# Patient Record
Sex: FEMALE | Race: WHITE | Employment: UNEMPLOYED | URBAN - METROPOLITAN AREA
[De-identification: names, ages, dates, MRNs, and addresses within clinical notes are randomized per-mention and may not be internally consistent; named-entity substitution may affect disease eponyms.]

---

## 2021-09-21 ENCOUNTER — HOSPITAL ENCOUNTER (OUTPATIENT)
Dept: PHYSICAL THERAPY | Age: 71
Setting detail: THERAPIES SERIES
Discharge: HOME | End: 2021-09-21
Payer: MEDICARE

## 2021-09-21 PROCEDURE — 97161 PT EVAL LOW COMPLEX 20 MIN: CPT

## 2021-09-21 PROCEDURE — 97110 THERAPEUTIC EXERCISES: CPT

## 2021-09-21 ASSESSMENT — PAIN DESCRIPTION - DIRECTION: RADIATING_TOWARDS: DENIES

## 2021-09-21 ASSESSMENT — PAIN DESCRIPTION - FREQUENCY: FREQUENCY: INTERMITTENT

## 2021-09-21 ASSESSMENT — PAIN DESCRIPTION - LOCATION: LOCATION: HIP

## 2021-09-21 ASSESSMENT — PAIN DESCRIPTION - ORIENTATION: ORIENTATION: LEFT

## 2021-09-21 ASSESSMENT — PAIN SCALES - GENERAL: PAINLEVEL_OUTOF10: 4

## 2021-09-21 ASSESSMENT — PAIN DESCRIPTION - DESCRIPTORS: DESCRIPTORS: THROBBING

## 2021-09-21 NOTE — PROGRESS NOTES
[address]     Ph: [phone]  Fax: [phone]    [] Certification  [] Recertification [x]  Plan of Care  [] Progress Note [] Discharge      To:  Pedro Cruz MD      From:  Mckenzie Pratt, PT  Patient: Pollo Fearing     : 1950  Diagnosis: other intervertebral disc degeneration, lumbar region     Date: 2021  Treatment Diagnosis: decreased standing posture, decreased lumbar spine ROM, decreased LE strength, decreased activity tolerance for regular exercise, walking and increased pain with ascending steps       Progress Report Period from:  2021  to 2021    Total # of Visits to Date: 1   No Show: 0    Canceled Appointment: 0     OBJECTIVE:   Short Term Goals - Time Frame for Short term goals: 2 wks    Goals Current/Discharge status  Met   Short term goal 1: Pt will demonstrate improved trunk extensor and B LE strength >/= 4+/5 for carryover to decreased pain with stair negotiation and prolonged amb. Strength RLE  Strength RLE: Exception  R Hip Flexion: 4+/5  R Hip Extension: 3+/5  R Hip ABduction: 3+/5  R Knee Flexion: 5/5  R Knee Extension: 5/5  R Ankle Dorsiflexion: 5/5  Strength LLE  Strength LLE: Exception  L Hip Flexion: 4+/5  L Hip Extension: 3/5  L Hip ABduction: 3/5  L Knee Flexion: 5/5  L Knee Extension: 5/5  L Ankle Dorsiflexion: 5/5        Strength Other  Other: isometric abdominal strength 5/5; isometric trunk ext strength 3/5   [] yes  [x] no     Long Term Goals - Time Frame for Long term goals : 6 wks  Goals Current/ Discharge status Met   Long term goal 1: Pt will demonstrate indep and compliance with % of the time for self management of L hip pain. HEP initiated [] yes  [x] no   Long term goal 2: Pt will demonstrate improved score on LEFS 70/80 indicating improved pt quality of life.  LEFS 39/80   [] yes  [] no   Long term goal 3: The pt will report decreased pain </=1/10 at worst in order to decresed pain with stair negoitation and prolonged amb. Pain Location: Hip    Pain Level: 4 (0-6/10)    Pain Descriptors: Throbbing   [] yes  [x] no     Body structures, Functions, Activity limitations: Decreased functional mobility , Increased pain, Decreased posture, Decreased ROM, Decreased strength, Decreased endurance  Assessment: Pt is 79 y.o. female with worsening L lateral hip pain. MD attributes to lumbar spine deficits; howevery, pt without c/o back pain. Lumbar x-ray shows diffuse arthritis and multilevel stenosis on 8/31/2021. Pt exhibits impairments including decreased standing posture, decreased lumbar spine ROM, decreased LE strength, decreased activity tolerance for regular exercise, walking and increased pain with ascending steps. Pt requires continued PT to address these impairments, progress strength and ROM, and provide pt with HEP for self management of pain. Prognosis: Good  Discharge Recommendations: Continue to assess pending progress      PT Education: Goals;PT Role;Plan of Care;Home Exercise Program    PLAN: [x] Evaluate only  Frequency/Duration:  Plan  Times per week: 2  Plan weeks: 6  Current Treatment Recommendations: Strengthening, Neuromuscular Re-education, Home Exercise Program, ROM, Manual Therapy - Soft Tissue Mobilization, Safety Education & Training, Balance Training, Endurance Training, Patient/Caregiver Education & Training, Functional Mobility Training, Equipment Evaluation, Education, & procurement, Modalities, Pain Management, Positioning                    Patient Status:[x] Continue/ Initiate plan of Care (tx to be initiated after continued visits approved by insurance)    [] Discharge PT. Recommend pt continue with HEP.      [] Additional visits requested, Please re-certify for additional visits:          Signature: Electronically signed by Paresh Sloan PT on 9/21/21 at 10:15 AM EDT      If you have any questions or concerns, please don't hesitate to call. Thank you for your referral.    I have reviewed this plan of care and certify a need for medically necessary rehabilitation services.     Physician Signature:__________________________________________________________  Date:  Please sign and return

## 2021-09-21 NOTE — PROGRESS NOTES
[address]  PHYSICAL THERAPY EVALUATION    Date: 2021  Patient Name: Maria L Paz       MRN: [id number]   Account: [de-identified]   : 1950  (69 y.o.)   Gender: female   Referring Practitioner: Lisa Vences MD                 Diagnosis: other intervertebral disc degeneration, lumbar region  Treatment Diagnosis: decreased standing posture, decreased lumbar spine ROM, decreased LE strength, decreased activity tolerance for regular exercise, walking and increased pain with ascending steps  Additional Pertinent Hx: shingles, OA        Past Medical History:  has no past medical history on file. Past Surgical History:   has no past surgical history on file. Vital Signs  Patient Currently in Pain: Yes   Pain Screening  Patient Currently in Pain: Yes  Pain Assessment  Pain Assessment: 0-10  Pain Level: 4 (0-6/10)  Pain Location: Hip  Pain Orientation: Left  Pain Radiating Towards: denies  Pain Descriptors: Throbbing  Pain Frequency: Intermittent     Lives With: Spouse  Type of Home: House  Home Layout: One level  Active : Yes     Subjective:  Subjective: Pt to PT due to continued and worsening pain in L lateral hip. Pt stated went to chiropractor 2 weeks ago- provided pt with edu on tennis ball massage, provided pt with stretches. While at appt with general practitioner, x-ray completed on lumbar spine: vertebral bodies normally aligned and normal height, no scoliosis, moderate disc space reduction is seen throughout the lumbar spine, no spondylolysis or spondylolisthesis, no fx, mild chronic anterior wedging within thoracolumbar region, soft tissue unremarkable. Pt states walks miles daily, bikes 3-4 xs/wk- pt reports increased pain with walking and ascending steps. Pt uses biofreeze at night, uses ice and heat for pain relief. Pt unable to lay on L side due to increased pain at night. No pain while sitting and resting.     Objective:   Sensation  Overall structures, Functions, Activity limitations: Decreased functional mobility , Increased pain, Decreased posture, Decreased ROM, Decreased strength, Decreased endurance  Assessment: Pt is 79 y.o. female with worsening L lateral hip pain. MD attributes to lumbar spine deficits; howevery, pt without c/o back pain. Lumbar x-ray shows diffuse arthritis and multilevel stenosis on 8/31/2021. Pt exhibits impairments including decreased standing posture, decreased lumbar spine ROM, decreased LE strength, decreased activity tolerance for regular exercise, walking and increased pain with ascending steps. Pt requires continued PT to address these impairments, progress strength and ROM, and provide pt with HEP for self management of pain. Prognosis: Good  Discharge Recommendations: Continue to assess pending progress     Decision Making: Low Complexity  History: low- shingles, OA  Exam: med- LEFS 39/80  Clinical Presentation: evolving- med    Plan  Frequency/Duration:  Plan  Times per week: 2  Plan weeks: 6  Current Treatment Recommendations: Strengthening, Neuromuscular Re-education, Home Exercise Program, ROM, Manual Therapy - Soft Tissue Mobilization, Safety Education & Training, Balance Training, Endurance Training, Patient/Caregiver Education & Training, Functional Mobility Training, Equipment Evaluation, Education, & procurement, Modalities, Pain Management, Positioning    Patient Education  New Education Provided: PT Education: Goals;PT Role;Plan of Care;Home Exercise Program    POST-PAIN     Pain Rating (0-10 pain scale):  4 /10  Location and pain description same as pre-treatment unless indicated. Action: [] NA  [] Call Physician  [] Perform HEP  [] Meds as prescribed    Evaluation and patient rights have been reviewed and patient agrees with plan of care.   Yes  [x]  No  []   Explain:       Abraham Fall Risk Assessment  Risk Factor Scale  Score   History of Falls [] Yes  [x] No 25  0 0   Secondary Diagnosis [] Yes  [x] No 15  0 0   Ambulatory Aid [] Furniture  [] Crutches/cane/walker  [x] None/bedrest/wheelchair/nurse 30  15  0 0   IV/Heparin Lock [] Yes  [x] No 20  0 0   Gait/Transferring [] Impaired  [] Weak  [x] Normal/bedrest/immobile 20  10  0 0   Mental Status [] Forgets limitations  [x] Oriented to own ability 15  0 0      Total:0     Based on the Assessment score: check the appropriate box. [x]  No intervention needed   Low =   Score of 0-24  []  Use standard prevention interventions Moderate =  Score of 24-44   [] Discuss fall prevention strategies   [] Indicate moderate falls risk on eval  []  Use high risk prevention interventions High = Score of 45 and higher   [] Discuss fall prevention strategies   [] Provide supervision during treatment time    Goals  Short term goals  Time Frame for Short term goals: 2 wks  Short term goal 1: Pt will demonstrate improved trunk extensor and B LE strength >/= 4+/5 for carryover to decreased pain with stair negotiation and prolonged amb. Long term goals  Time Frame for Long term goals : 6 wks  Long term goal 1: Pt will demonstrate indep and compliance with % of the time for self management of L hip pain. Long term goal 2: Pt will demonstrate improved score on LEFS 70/80 indicating improved pt quality of life. Long term goal 3: The pt will report decreased pain </=1/10 at worst in order to decresed pain with stair negoitation and prolonged amb.     PT Individual Minutes  Time In: 0840  Time Out: 7762  Minutes: 45  Procedure Minutes: jesus 45 min     Electronically signed by Donna Forde PT on 9/21/21 at 10:10 AM EDT

## 2021-09-29 ENCOUNTER — HOSPITAL ENCOUNTER (OUTPATIENT)
Dept: PHYSICAL THERAPY | Age: 71
Setting detail: THERAPIES SERIES
Discharge: HOME | End: 2021-09-29
Payer: MEDICARE

## 2021-09-29 PROCEDURE — 97110 THERAPEUTIC EXERCISES: CPT

## 2021-09-29 ASSESSMENT — PAIN DESCRIPTION - ORIENTATION: ORIENTATION: LEFT

## 2021-09-29 ASSESSMENT — PAIN SCALES - GENERAL: PAINLEVEL_OUTOF10: 3

## 2021-09-29 ASSESSMENT — PAIN DESCRIPTION - LOCATION: LOCATION: HIP

## 2021-09-29 NOTE — PROGRESS NOTES
[address] [address]  Outpatient Physical Therapy    Treatment Note        Date: 2021  Patient: Raymond Escobar  : 1950  ACCT #: [de-identified]  Referring Practitioner: Milo De Souza MD  Diagnosis: other intervertebral disc degeneration, lumbar region  Treatment Diagnosis: decreased standing posture, decreased lumbar spine ROM, decreased LE strength, decreased activity tolerance for regular exercise, walking and increased pain with ascending steps    Visit Information:  PT Visit Information  Onset Date:  (2021)  PT Insurance Information: BCBS  Total # of Visits Approved:  (eval only- 8 visits given -)  Total # of Visits to Date: 2  No Show: 0  Canceled Appointment: 0  Progress Note Counter:     Subjective: 3/10 L lateral hip     HEP Compliance:  [] Good [x] Fair [] Poor [] Reports not doing due to:    Vital Signs  Patient Currently in Pain: Yes   Pain Screening  Patient Currently in Pain: Yes  Pain Assessment  Pain Level: 3  Pain Location: Hip  Pain Orientation: Left    OBJECTIVE:   Exercises  Exercise 1: bike fwd/back 2.5  Exercise 2: figure 4 seated 30 sec X 3 b/l  Exercise 3: sciatic nerve glide R/L*  Exercise 4: TA iso 10 sec  X10  Exercise 5: bridge X 10  Exercise 6: LTR X 10- cues for TA iso  Exercise 7: SKTC 20 sec X 3 B  Exercise 8: s/l clam, reverse clam, hip abd X 10 L LE ea  Exercise 9: prone hip ext X 10 b/l  Exercise 10: prone press ups*  Exercise 11: DLS progression supine/quadriped*  Exercise 12: 4 way hip*  Exercise 13: side plank bent knees*  Exercise 14: step ups fwd/lat*  Exercise 15: quadriped*  Exercise 16: ITB stretch 20 sec X 3  Exercise 20: HEP: bridge, LTR, SKTC, clam, reverse clam, hip abd, ITB stretch, prone hip ext    Strength: [x] NT  [] MMT completed:    ROM: [x] NT  [] ROM measurements:    Modalities:  Modalities  Moist heat: L hip X 10 min  Cryotherapy (Minutes\Location):  *  Ultrasound: L lateral hip 1 MHz, 100% duty cycle, 0.5 W/cm^2*  E-stim (parameters): IFC L hip*   *Indicates exercise, modality, or manual techniques to be initiated when appropriate    Assessment: Body structures, Functions, Activity limitations: Decreased functional mobility , Increased pain, Decreased posture, Decreased ROM, Decreased strength, Decreased endurance  Assessment: Initiated progression of core and lateral hip strengthening and stretching B hips for carryover to decreased L lateral hip pain- no c/o pain during ex. MH used at end of session to decreased fatigue. Treatment Diagnosis: decreased standing posture, decreased lumbar spine ROM, decreased LE strength, decreased activity tolerance for regular exercise, walking and increased pain with ascending steps  Prognosis: Good    Goals:  Short term goals  Time Frame for Short term goals: 2 wks  Short term goal 1: Pt will demonstrate improved trunk extensor and B LE strength >/= 4+/5 for carryover to decreased pain with stair negotiation and prolonged amb. Long term goals  Time Frame for Long term goals : 6 wks  Long term goal 1: Pt will demonstrate indep and compliance with % of the time for self management of L hip pain. Long term goal 2: Pt will demonstrate improved score on LEFS 70/80 indicating improved pt quality of life. Long term goal 3: The pt will report decreased pain </=1/10 at worst in order to decresed pain with stair negoitation and prolonged amb. Progress toward goals: ongoing, progressing strength and ROM    POST-PAIN       Pain Rating (0-10 pain scale):   0/10   Location and pain description same as pre-treatment unless indicated.    Action: [x] NA   [] Perform HEP  [] Meds as prescribed  [] Modalities as prescribed   [] Call Physician     Frequency/Duration:  Plan  Times per week: 2  Plan weeks: 6  Current Treatment Recommendations: Strengthening, Neuromuscular Re-education, Home Exercise Program, ROM, Manual Therapy - Soft Tissue Mobilization, Safety Education & Training, Balance Training, Endurance Training, Patient/Caregiver Education & Training, Functional Mobility Training, Equipment Evaluation, Education, & procurement, Modalities, Pain Management, Positioning     Pt to continue current HEP. See objective section for any therapeutic exercise changes, additions or modifications this date.     PT Individual Minutes  Time In: 9749  Time Out: 1130  Minutes: 50  Timed Code Treatment Minutes: 40 Minutes  Procedure Minutes: MH X 10 min     Timed Activity Minutes Units   Ther Ex 40 3     Signature:  Electronically signed by Elijah Hughes PT on 9/29/21 at 11:43 AM EDT

## 2021-10-01 ENCOUNTER — HOSPITAL ENCOUNTER (OUTPATIENT)
Dept: PHYSICAL THERAPY | Age: 71
Setting detail: THERAPIES SERIES
Discharge: HOME | End: 2021-10-01
Payer: MEDICARE

## 2021-10-05 ENCOUNTER — HOSPITAL ENCOUNTER (OUTPATIENT)
Dept: PHYSICAL THERAPY | Age: 71
Setting detail: THERAPIES SERIES
Discharge: HOME | End: 2021-10-05
Payer: MEDICARE

## 2021-10-05 PROCEDURE — 97110 THERAPEUTIC EXERCISES: CPT

## 2021-10-05 ASSESSMENT — PAIN DESCRIPTION - ORIENTATION: ORIENTATION: LEFT

## 2021-10-05 ASSESSMENT — PAIN DESCRIPTION - LOCATION: LOCATION: HIP

## 2021-10-05 ASSESSMENT — PAIN SCALES - GENERAL: PAINLEVEL_OUTOF10: 3

## 2021-10-05 NOTE — PROGRESS NOTES
[id number] [address]  Outpatient Physical Therapy    Treatment Note        Date: 10/5/2021  Patient: Elmo Perera  : 1950  ACCT #: [de-identified]  Referring Practitioner: Elli Alan MD  Diagnosis: other intervertebral disc degeneration, lumbar region  Treatment Diagnosis: decreased standing posture, decreased lumbar spine ROM, decreased LE strength, decreased activity tolerance for regular exercise, walking and increased pain with ascending steps    Visit Information:  PT Visit Information  Onset Date:  (2021)  PT Insurance Information: BCBS  Total # of Visits Approved:  (eval only- 8 visits given -)  Total # of Visits to Date: 3  No Show: 0  Canceled Appointment: 1  Progress Note Counter:     Subjective: 3/10 L lateral hip; \"my mom passed away and I haven't been able to do my exercises like I usually do\"     HEP Compliance:  [x] Good [] Fair [] Poor [] Reports not doing due to:    Vital Signs  Patient Currently in Pain: Yes   Pain Screening  Patient Currently in Pain: Yes  Pain Assessment  Pain Level: 3  Pain Location: Hip  Pain Orientation: Left    OBJECTIVE:   Exercises  Exercise 1: bike fwd/back 2.5  Exercise 2: figure 4 seated 30 sec X 3 b/l  Exercise 3: sciatic nerve glide R/L supine and seated  Exercise 5: bridge single leg X 10 b/l  Exercise 8: s/l clam, reverse clam, hip abd X 10 L LE ea  Exercise 10: prone press ups*  Exercise 11: DLS progression supine*  Exercise 12: 4 way hip*  Exercise 13: side plank bent knees*  Exercise 14: step ups fwd/lat*  Exercise 15: quadriped hip ext, fire hydrant X 10  b/l  Exercise 16: ITB stretch 20 sec X 3  Exercise 20: HEP: seated sciatic nerve glide, SL bridge, quadruped leg ext and fire hydrant, salima pose, side plank on knees    Strength: [x] NT  [] MMT completed:    ROM: [x] NT  [] ROM measurements:    Modalities:  Modalities  Moist heat: L hip X 10 min  Cryotherapy (Minutes\Location):  *  Ultrasound: L lateral hip 1 MHz, 100% duty cycle, 0.5 W/cm^2*  E-stim (parameters): IFC L hip*   *Indicates exercise, modality, or manual techniques to be initiated when appropriate    Assessment: Body structures, Functions, Activity limitations: Decreased functional mobility , Increased pain, Decreased posture, Decreased ROM, Decreased strength, Decreased endurance  Assessment: Continued progression of core and lateral hip strengthening and stretching B hips for carryover to decreased L lateral hip pain- no c/o pain during ex. Pt provided with continued progressions of HEP and pt consistenly completing HEP. Pt stated MH decreased pain at end of last session and is using heat at home. MH used at end of session to decreased fatigue. Treatment Diagnosis: decreased standing posture, decreased lumbar spine ROM, decreased LE strength, decreased activity tolerance for regular exercise, walking and increased pain with ascending steps  Prognosis: Good       Goals:  Short term goals  Time Frame for Short term goals: 2 wks  Short term goal 1: Pt will demonstrate improved trunk extensor and B LE strength >/= 4+/5 for carryover to decreased pain with stair negotiation and prolonged amb. Long term goals  Time Frame for Long term goals : 6 wks  Long term goal 1: Pt will demonstrate indep and compliance with % of the time for self management of L hip pain. Long term goal 2: Pt will demonstrate improved score on LEFS 70/80 indicating improved pt quality of life. Long term goal 3: The pt will report decreased pain </=1/10 at worst in order to decresed pain with stair negoitation and prolonged amb. Progress toward goals: ongoing, progressing strength and ROM    POST-PAIN       Pain Rating (0-10 pain scale):   1/10   Location and pain description same as pre-treatment unless indicated.    Action: [x] NA   [] Perform HEP  [] Meds as prescribed  [] Modalities as prescribed   [] Call Physician     Frequency/Duration:  Plan  Times per week: 2  Plan weeks: 6  Current Treatment Recommendations: Strengthening, Neuromuscular Re-education, Home Exercise Program, ROM, Manual Therapy - Soft Tissue Mobilization, Safety Education & Training, Balance Training, Endurance Training, Patient/Caregiver Education & Training, Functional Mobility Training, Equipment Evaluation, Education, & procurement, Modalities, Pain Management, Positioning     Pt to continue current HEP. See objective section for any therapeutic exercise changes, additions or modifications this date.     PT Individual Minutes  Time In: 0302  Time Out: 1430  Minutes: 48  Timed Code Treatment Minutes: 38 Minutes  Procedure Minutes: MH X 10 min     Timed Activity Minutes Units   Ther Ex 38 3     Signature:  Electronically signed by Elvis Goldberg, PT on 10/5/21 at 3:31 PM EDT

## 2021-10-07 ENCOUNTER — HOSPITAL ENCOUNTER (OUTPATIENT)
Dept: PHYSICAL THERAPY | Age: 71
Setting detail: THERAPIES SERIES
Discharge: HOME | End: 2021-10-07
Payer: MEDICARE

## 2021-10-07 PROCEDURE — 97110 THERAPEUTIC EXERCISES: CPT

## 2021-10-07 ASSESSMENT — PAIN DESCRIPTION - LOCATION: LOCATION: HIP

## 2021-10-07 ASSESSMENT — PAIN SCALES - GENERAL: PAINLEVEL_OUTOF10: 4

## 2021-10-07 ASSESSMENT — PAIN DESCRIPTION - ORIENTATION: ORIENTATION: LEFT

## 2021-10-07 NOTE — PROGRESS NOTES
[address] [address]  Outpatient Physical Therapy    Treatment Note        Date: 10/7/2021  Patient: Gerardo Fabian  : 1950  ACCT #: [de-identified]  Referring Practitioner: Robert Rivas MD  Diagnosis: other intervertebral disc degeneration, lumbar region  Treatment Diagnosis: decreased standing posture, decreased lumbar spine ROM, decreased LE strength, decreased activity tolerance for regular exercise, walking and increased pain with ascending steps    Visit Information:  PT Visit Information  Onset Date:  (2021)  PT Insurance Information: BCBS  Total # of Visits Approved:  (eval only- 8 visits given -)  Total # of Visits to Date: 4  No Show: 0  Canceled Appointment: 1  Progress Note Counter: 3/8    Subjective: 4/10 L lateral hip     HEP Compliance:  [x] Good [] Fair [] Poor [] Reports not doing due to:    Vital Signs  Patient Currently in Pain: Yes   Pain Screening  Patient Currently in Pain: Yes  Pain Assessment  Pain Level: 4  Pain Location: Hip  Pain Orientation: Left    OBJECTIVE:   Exercises  Exercise 1: bike fwd/back 2.5  Exercise 2: figure 4 seated 30 sec X 3 b/l  Exercise 7: SKTC 30 sec X 3 B  Exercise 10: prone press ups with TA iso X 10  Exercise 11: DLS progression supine*  Exercise 12: 4 way hip*  Exercise 14: step ups fwd/lat*  Exercise 15: quadriped hip ext, fire hydrant, UE, and bird dog X 10  b/l  Exercise 16: ITB stretch 20 sec X 3  Exercise 17: DKTC on pball with TA iso X 15  Exercise 18: salima pose 30 sec X 3  Exercise 20: HEP: DKTC pball, prone press ups, quad alter arm and bird dog    Strength: [x] NT  [] MMT completed:    ROM: [x] NT  [] ROM measurements:    Modalities:  Modalities  Moist heat: *  Cryotherapy (Minutes\Location): L hip X 10 min  Ultrasound: L lateral hip 1 MHz, 100% duty cycle, 0.5 W/cm^2*  E-stim (parameters): IFC L hip*   *Indicates exercise, modality, or manual techniques to be initiated when appropriate    Assessment:    Body structures, Functions, Activity limitations: Decreased functional mobility , Increased pain, Decreased posture, Decreased ROM, Decreased strength, Decreased endurance  Assessment: Continued progression of core and lateral hip strengthening and stretching B hips for carryover to decreased L lateral hip pain- no c/o pain during ex. Pt provided with continued progressions of HEP and pt consistenly completing HEP. Trialed CP at end of session for decreased pain to compared to [address]. Treatment Diagnosis: decreased standing posture, decreased lumbar spine ROM, decreased LE strength, decreased activity tolerance for regular exercise, walking and increased pain with ascending steps  Prognosis: Good       Goals:  Short term goals  Time Frame for Short term goals: 2 wks  Short term goal 1: Pt will demonstrate improved trunk extensor and B LE strength >/= 4+/5 for carryover to decreased pain with stair negotiation and prolonged amb. Long term goals  Time Frame for Long term goals : 6 wks  Long term goal 1: Pt will demonstrate indep and compliance with % of the time for self management of L hip pain. Long term goal 2: Pt will demonstrate improved score on LEFS 70/80 indicating improved pt quality of life. Long term goal 3: The pt will report decreased pain </=1/10 at worst in order to decresed pain with stair negoitation and prolonged amb. Progress toward goals: ongoing, progressing strength and ROM    POST-PAIN       Pain Rating (0-10 pain scale):   2/10   Location and pain description same as pre-treatment unless indicated.    Action: [x] NA   [] Perform HEP  [] Meds as prescribed  [] Modalities as prescribed   [] Call Physician     Frequency/Duration:  Plan  Times per week: 2  Plan weeks: 6  Current Treatment Recommendations: Strengthening, Neuromuscular Re-education, Home Exercise Program, ROM, Manual Therapy - Soft Tissue Mobilization, Safety Education & Training, Balance Training, Endurance Training, Patient/Caregiver Education & Training, Functional Mobility Training, Equipment Evaluation, Education, & procurement, Modalities, Pain Management, Positioning     Pt to continue current HEP. See objective section for any therapeutic exercise changes, additions or modifications this date.     PT Individual Minutes  Time In: 2165  Time Out: 0930  Minutes: 49  Timed Code Treatment Minutes: 39 Minutes  Procedure Minutes: CP X 10 min     Timed Activity Minutes Units   Ther Ex 39 3     Signature:  Electronically signed by Bret Ramos PT on 10/7/21 at 9:27 AM EDT

## 2021-10-12 ENCOUNTER — HOSPITAL ENCOUNTER (OUTPATIENT)
Dept: PHYSICAL THERAPY | Age: 71
Setting detail: THERAPIES SERIES
Discharge: HOME | End: 2021-10-12
Payer: MEDICARE

## 2021-10-12 PROCEDURE — 97110 THERAPEUTIC EXERCISES: CPT

## 2021-10-12 ASSESSMENT — PAIN DESCRIPTION - ORIENTATION: ORIENTATION: LEFT

## 2021-10-12 ASSESSMENT — PAIN DESCRIPTION - PAIN TYPE: TYPE: ACUTE PAIN

## 2021-10-12 ASSESSMENT — PAIN DESCRIPTION - LOCATION: LOCATION: HIP

## 2021-10-12 NOTE — PROGRESS NOTES
[address] [address]  Outpatient Physical Therapy    Treatment Note        Date: 10/12/2021  Patient: Britney Duke  : 1950  ACCT #: [de-identified]  Referring Practitioner: Monae Cervantes MD  Diagnosis: other intervertebral disc degeneration, lumbar region  Treatment Diagnosis: decreased standing posture, decreased lumbar spine ROM, decreased LE strength, decreased activity tolerance for regular exercise, walking and increased pain with ascending steps    Visit Information:  PT Visit Information  Onset Date:  (2021)  PT Insurance Information: [employer]  Total # of Visits Approved:  (eval only- 8 visits given -)  Total # of Visits to Date: 5  No Show: 0  Canceled Appointment: 1  Progress Note Counter:     Subjective: 2/10 L lateral hip     HEP Compliance:  [x] Good [] Fair [] Poor [] Reports not doing due to:    Vital Signs  Patient Currently in Pain: Yes   Pain Screening  Patient Currently in Pain: Yes  Pain Assessment  Pain Type: Acute pain  Pain Location: Hip  Pain Orientation: Left    OBJECTIVE:   Exercises  Exercise 1: bike fwd/back 2.5  Exercise 2: figure 4 seated 30 sec X 3 b/l  Exercise 3: sciatic nerve glide R/L supine X 3 b/l  Exercise 11: DLS progression supine*  Exercise 12: 4 way hip YTB b/l X 10 with unilat UE support  Exercise 14: step ups fwd/lat*  Exercise 15: quadriped hip ext, fire hydrant, UE, and bird dog X 10  b/l  Exercise 16: ITB stretch standing 20 sec X 3  Exercise 19: foam roller L hip X 2 min  Exercise 20: HEP: standing IT band stretch; 4 way hip with TB    Strength: [x] NT  [] MMT completed:    ROM: [x] NT  [] ROM measurements:    Modalities:  Modalities  Moist heat: *  Cryotherapy (Minutes\Location): L hip X 10 min  Ultrasound: L lateral hip 1 MHz, 100% duty cycle, 0.5 W/cm^2*  E-stim (parameters): IFC L hip*   *Indicates exercise, modality, or manual techniques to be initiated when appropriate    Assessment:    Body structures, Functions, Activity limitations: Decreased functional mobility , Increased pain, Decreased posture, Decreased ROM, Decreased strength, Decreased endurance  Assessment: Continued progression of core and lateral hip strengthening and stretching B hips for carryover to decreased L lateral hip pain- no c/o pain during ex. Pt provided with continued progressions of HEP and pt consistenly completing HEP. Pt stated no difference b/t pain control with MH vs CP. CP provided at end of session for decreased pain. Treatment Diagnosis: decreased standing posture, decreased lumbar spine ROM, decreased LE strength, decreased activity tolerance for regular exercise, walking and increased pain with ascending steps  Prognosis: Good       Goals:  Short term goals  Time Frame for Short term goals: 2 wks  Short term goal 1: Pt will demonstrate improved trunk extensor and B LE strength >/= 4+/5 for carryover to decreased pain with stair negotiation and prolonged amb. Long term goals  Time Frame for Long term goals : 6 wks  Long term goal 1: Pt will demonstrate indep and compliance with % of the time for self management of L hip pain. Long term goal 2: Pt will demonstrate improved score on LEFS 70/80 indicating improved pt quality of life. Long term goal 3: The pt will report decreased pain </=1/10 at worst in order to decresed pain with stair negoitation and prolonged amb. Progress toward goals: ongoing, progressing strength and ROM    POST-PAIN       Pain Rating (0-10 pain scale):   1/10   Location and pain description same as pre-treatment unless indicated.    Action: [x] NA   [] Perform HEP  [] Meds as prescribed  [] Modalities as prescribed   [] Call Physician     Frequency/Duration:  Plan  Times per week: 2  Plan weeks: 6  Current Treatment Recommendations: Strengthening, Neuromuscular Re-education, Home Exercise Program, ROM, Manual Therapy - Soft Tissue Mobilization, Safety Education & Training, Balance Training, Endurance Training, Patient/Caregiver Education & Training, Functional Mobility Training, Equipment Evaluation, Education, & procurement, Modalities, Pain Management, Positioning     Pt to continue current HEP. See objective section for any therapeutic exercise changes, additions or modifications this date.     PT Individual Minutes  Time In: 0803  Time Out: 9879  Minutes: 48  Timed Code Treatment Minutes: 38 Minutes  Procedure Minutes: CP X 10 min     Timed Activity Minutes Units   Ther Ex 38 3       Signature:  Electronically signed by Jaren Calvo PT on 10/12/21 at 8:48 AM EDT

## 2021-10-14 ENCOUNTER — HOSPITAL ENCOUNTER (OUTPATIENT)
Dept: PHYSICAL THERAPY | Age: 71
Setting detail: THERAPIES SERIES
Discharge: HOME | End: 2021-10-14
Payer: MEDICARE

## 2021-10-14 PROCEDURE — 97110 THERAPEUTIC EXERCISES: CPT

## 2021-10-14 PROCEDURE — 97035 APP MDLTY 1+ULTRASOUND EA 15: CPT

## 2021-10-14 ASSESSMENT — PAIN DESCRIPTION - LOCATION: LOCATION: HIP

## 2021-10-14 ASSESSMENT — PAIN SCALES - GENERAL: PAINLEVEL_OUTOF10: 2

## 2021-10-14 NOTE — PROGRESS NOTES
Activity limitations: Decreased functional mobility , Increased pain, Decreased posture, Decreased ROM, Decreased strength, Decreased endurance  Assessment: Continued progression of core and lateral hip strengthening and stretching B hips for carryover to decreased L lateral hip pain- no c/o pain during ex. Due to continued c/o 2/10 pain L hip- initiated US for pain relief this- pt to assess for benefits. 1/10 at end of session. Treatment Diagnosis: decreased standing posture, decreased lumbar spine ROM, decreased LE strength, decreased activity tolerance for regular exercise, walking and increased pain with ascending steps  Prognosis: Good       Goals:  Short term goals  Time Frame for Short term goals: 2 wks  Short term goal 1: Pt will demonstrate improved trunk extensor and B LE strength >/= 4+/5 for carryover to decreased pain with stair negotiation and prolonged amb. Long term goals  Time Frame for Long term goals : 6 wks  Long term goal 1: Pt will demonstrate indep and compliance with % of the time for self management of L hip pain. Long term goal 2: Pt will demonstrate improved score on LEFS 70/80 indicating improved pt quality of life. Long term goal 3: The pt will report decreased pain </=1/10 at worst in order to decresed pain with stair negoitation and prolonged amb. Progress toward goals: ongoing progressing strength and ROM    POST-PAIN       Pain Rating (0-10 pain scale):   1/10   Location and pain description same as pre-treatment unless indicated.    Action: [x] NA   [] Perform HEP  [] Meds as prescribed  [] Modalities as prescribed   [] Call Physician     Frequency/Duration:  Plan  Times per week: 2  Plan weeks: 6  Current Treatment Recommendations: Strengthening, Neuromuscular Re-education, Home Exercise Program, ROM, Manual Therapy - Soft Tissue Mobilization, Safety Education & Training, Balance Training, Endurance Training, Patient/Caregiver Education & Training, Functional Mobility Training, Equipment Evaluation, Education, & procurement, Modalities, Pain Management, Positioning     Pt to continue current HEP. See objective section for any therapeutic exercise changes, additions or modifications this date.     PT Individual Minutes  Time In: 5337  Time Out: 9346  Minutes: 43  Timed Code Treatment Minutes: 43 Minutes    Timed Activity Minutes Units   Ther Ex 35 2   US 8 1       Signature:  Electronically signed by Mitzy Hector PT on 10/14/21 at 12:06 PM EDT

## 2021-11-09 ENCOUNTER — HOSPITAL ENCOUNTER (OUTPATIENT)
Dept: PHYSICAL THERAPY | Age: 71
Setting detail: THERAPIES SERIES
Discharge: HOME | End: 2021-11-09
Payer: MEDICARE

## 2021-11-09 NOTE — PROGRESS NOTES
[address]     Ph: [phone]  Fax: [phone]    [] Certification  [] Recertification []  Plan of Care  [] Progress Note [x] Discharge      To:  Rosa Maria Sandhu MD      From:  Ekta Washburn, PT  Patient: Donny Mckinnon     : 1950  Diagnosis: other intervertebral disc degeneration, lumbar region     Date: 2021  Treatment Diagnosis: decreased standing posture, decreased lumbar spine ROM, decreased LE strength, decreased activity tolerance for regular exercise, walking and increased pain with ascending steps       Progress Report Period from:  2021  to 2021    Total # of Visits to Date: 6   No Show: 0    Canceled Appointment: 1     OBJECTIVE:   Short Term Goals - Time Frame for Short term goals: 2 wks    Goals Current/Discharge status  Met   Short term goal 1: Pt will demonstrate improved trunk extensor and B LE strength >/= 4+/5 for carryover to decreased pain with stair negotiation and prolonged amb. Pt stated return to normal functional activities [x] yes  [] no     Long Term Goals - Time Frame for Long term goals : 6 wks  Goals Current/ Discharge status Met   Long term goal 1: Pt will demonstrate indep and compliance with % of the time for self management of L hip pain. Pt indep and complaint with HEP at last visit [x] yes  [] no   Long term goal 2: Pt will demonstrate improved score on LEFS 70/80 indicating improved pt quality of life.  Unable to assess due to pt did not complete d/c visit [] yes  [x] no   Long term goal 3: The pt will report decreased pain </=1/10 at worst in order to decresed pain with stair negoitation and prolonged amb. 0/10 pt stated over the phone [x] yes  [] no     Body structures, Functions, Activity limitations: Decreased functional mobility , Increased pain, Decreased posture, Decreased ROM, Decreased strength, Decreased endurance  Assessment: Pt called and requested d/c stating no pain and return to normal functional activities. Prognosis: Good    PLAN: [x]   Frequency/Duration:  Plan  Pt self d/c                     Patient Status:[] Continue/ Initiate plan of Care    [x] Discharge PT. Recommend pt continue with HEP. [] Additional visits requested, Please re-certify for additional visits:          Signature: Electronically signed by Kirstin Bal PT on 11/9/21 at 10:32 AM EST      If you have any questions or concerns, please don't hesitate to call. Thank you for your referral.    I have reviewed this plan of care and certify a need for medically necessary rehabilitation services.     Physician Signature:__________________________________________________________  Date:  Please sign and return

## 2021-11-09 NOTE — PROGRESS NOTES
Therapy                            Cancellation/No-show Note      Date:  2021  Patient Name:  Samira Guardado  :  1950   MRN:  [id number]  Referring Practitioner: Gabby Alatorre MD  Diagnosis: other intervertebral disc degeneration, lumbar region    Visit Information:  PT Visit Information  Onset Date:  (2021)  PT Insurance Information: BCBS  Total # of Visits Approved:  (eval only- 8 visits given -)  Total # of Visits to Date: 6  No Show: 0  Canceled Appointment: 1  Progress Note Counter:     For today's appointment patient:  [x]  Cancelled  []  Rescheduled appointment  []  No-show   []  Called pt to remind of next appointment     Reason given by patient:  []  Patient ill  []  Conflicting appointment  []  No transportation    []  Conflict with work  []  No reason given  [x]  Other:  Pt requesting d/c due to feeling better    [] Pt has future appointments scheduled, no follow up needed  [] Pt requests to be on hold.     Reason:   If > 2 weeks please discuss with therapist.  [] Therapist to call pt for follow up    Signature: Electronically signed by Bre Singh PT on 21 at 10:30 AM EST